# Patient Record
Sex: MALE | Race: WHITE | NOT HISPANIC OR LATINO
[De-identification: names, ages, dates, MRNs, and addresses within clinical notes are randomized per-mention and may not be internally consistent; named-entity substitution may affect disease eponyms.]

---

## 2017-11-08 ENCOUNTER — RECORDS - HEALTHEAST (OUTPATIENT)
Dept: ADMINISTRATIVE | Facility: OTHER | Age: 52
End: 2017-11-08

## 2017-11-09 ENCOUNTER — RECORDS - HEALTHEAST (OUTPATIENT)
Dept: ADMINISTRATIVE | Facility: OTHER | Age: 52
End: 2017-11-09

## 2017-11-10 ASSESSMENT — MIFFLIN-ST. JEOR: SCORE: 1682.67

## 2017-11-11 ENCOUNTER — ANESTHESIA - HEALTHEAST (OUTPATIENT)
Dept: SURGERY | Facility: HOSPITAL | Age: 52
End: 2017-11-11

## 2017-11-11 ENCOUNTER — SURGERY - HEALTHEAST (OUTPATIENT)
Dept: SURGERY | Facility: HOSPITAL | Age: 52
End: 2017-11-11

## 2017-11-17 ENCOUNTER — COMMUNICATION - HEALTHEAST (OUTPATIENT)
Dept: SURGERY | Facility: CLINIC | Age: 52
End: 2017-11-17

## 2017-11-17 DIAGNOSIS — R11.0 NAUSEA: ICD-10-CM

## 2017-12-05 ENCOUNTER — OFFICE VISIT - HEALTHEAST (OUTPATIENT)
Dept: SURGERY | Facility: CLINIC | Age: 52
End: 2017-12-05

## 2017-12-05 DIAGNOSIS — Z48.89 POSTOPERATIVE VISIT: ICD-10-CM

## 2017-12-27 ENCOUNTER — COMMUNICATION - HEALTHEAST (OUTPATIENT)
Dept: SURGERY | Facility: CLINIC | Age: 52
End: 2017-12-27

## 2017-12-27 DIAGNOSIS — R10.9 ABDOMINAL PAIN: ICD-10-CM

## 2017-12-28 ENCOUNTER — AMBULATORY - HEALTHEAST (OUTPATIENT)
Dept: SURGERY | Facility: CLINIC | Age: 52
End: 2017-12-28

## 2017-12-28 ENCOUNTER — HOSPITAL ENCOUNTER (OUTPATIENT)
Dept: CT IMAGING | Facility: CLINIC | Age: 52
Discharge: HOME | End: 2017-12-28
Attending: SPECIALIST

## 2017-12-28 DIAGNOSIS — R10.9 ABDOMINAL PAIN: ICD-10-CM

## 2017-12-28 ASSESSMENT — MIFFLIN-ST. JEOR: SCORE: 1630.51

## 2017-12-29 ENCOUNTER — SURGERY - HEALTHEAST (OUTPATIENT)
Dept: SURGERY | Facility: HOSPITAL | Age: 52
End: 2017-12-29

## 2017-12-29 ENCOUNTER — ANESTHESIA - HEALTHEAST (OUTPATIENT)
Dept: SURGERY | Facility: HOSPITAL | Age: 52
End: 2017-12-29

## 2018-01-03 ENCOUNTER — AMBULATORY - HEALTHEAST (OUTPATIENT)
Dept: SURGERY | Facility: CLINIC | Age: 53
End: 2018-01-03

## 2018-01-03 DIAGNOSIS — K76.9 LIVER LESION: ICD-10-CM

## 2018-01-12 ENCOUNTER — AMBULATORY - HEALTHEAST (OUTPATIENT)
Dept: ONCOLOGY | Facility: HOSPITAL | Age: 53
End: 2018-01-12

## 2018-01-12 ENCOUNTER — OFFICE VISIT - HEALTHEAST (OUTPATIENT)
Dept: INTERNAL MEDICINE | Facility: CLINIC | Age: 53
End: 2018-01-12

## 2018-01-12 DIAGNOSIS — C17.1 ADENOCARCINOMA OF JEJUNUM (H): ICD-10-CM

## 2018-01-12 DIAGNOSIS — R16.0 LIVER MASS: ICD-10-CM

## 2018-01-12 ASSESSMENT — MIFFLIN-ST. JEOR: SCORE: 1623.13

## 2018-01-15 ENCOUNTER — HOSPITAL ENCOUNTER (OUTPATIENT)
Dept: CT IMAGING | Facility: HOSPITAL | Age: 53
Discharge: HOME | End: 2018-01-15
Attending: SPECIALIST | Admitting: RADIOLOGY

## 2018-01-15 ENCOUNTER — HOSPITAL ENCOUNTER (OUTPATIENT)
Dept: INTERVENTIONAL RADIOLOGY/VASCULAR | Facility: HOSPITAL | Age: 53
Discharge: HOME | End: 2018-01-15
Attending: SPECIALIST

## 2018-01-15 ENCOUNTER — HOSPITAL ENCOUNTER (OUTPATIENT)
Dept: ULTRASOUND IMAGING | Facility: HOSPITAL | Age: 53
Discharge: HOME | End: 2018-01-15
Attending: SPECIALIST

## 2018-01-15 DIAGNOSIS — K76.9 LIVER LESION: ICD-10-CM

## 2018-01-15 LAB
HGB BLD-MCNC: 15.1 G/DL (ref 14–18)
INR PPP: 0.96 (ref 0.9–1.1)
PLATELET # BLD AUTO: 328 THOU/UL (ref 140–440)

## 2018-01-15 ASSESSMENT — MIFFLIN-ST. JEOR: SCORE: 1630.51

## 2018-01-16 ENCOUNTER — OFFICE VISIT - HEALTHEAST (OUTPATIENT)
Dept: SURGERY | Facility: CLINIC | Age: 53
End: 2018-01-16

## 2018-01-16 DIAGNOSIS — C17.9 ADENOCARCINOMA OF SMALL INTESTINE, STAGE 4 (H): ICD-10-CM

## 2018-01-16 LAB
LAB AP CHARGES (HE HISTORICAL CONVERSION): ABNORMAL
LAB AP INITIAL CYTO EVAL (HE HISTORICAL CONVERSION): ABNORMAL
LAB MED GENERAL PATH INTERP (HE HISTORICAL CONVERSION): ABNORMAL
PATH REPORT.COMMENTS IMP SPEC: ABNORMAL
PATH REPORT.COMMENTS IMP SPEC: ABNORMAL
PATH REPORT.FINAL DX SPEC: ABNORMAL
PATH REPORT.MICROSCOPIC SPEC OTHER STN: ABNORMAL
PATH REPORT.RELEVANT HX SPEC: ABNORMAL
SPECIMEN DESCRIPTION: ABNORMAL

## 2018-01-18 ENCOUNTER — ANESTHESIA - HEALTHEAST (OUTPATIENT)
Dept: SURGERY | Facility: AMBULATORY SURGERY CENTER | Age: 53
End: 2018-01-18

## 2018-01-18 ASSESSMENT — MIFFLIN-ST. JEOR: SCORE: 1630.51

## 2018-01-19 ENCOUNTER — HOSPITAL ENCOUNTER (OUTPATIENT)
Dept: PET IMAGING | Facility: HOSPITAL | Age: 53
Discharge: HOME | End: 2018-01-19
Attending: INTERNAL MEDICINE

## 2018-01-19 ENCOUNTER — SURGERY - HEALTHEAST (OUTPATIENT)
Dept: SURGERY | Facility: AMBULATORY SURGERY CENTER | Age: 53
End: 2018-01-19

## 2018-01-19 DIAGNOSIS — C17.9 SMALL BOWEL CANCER (H): ICD-10-CM

## 2018-01-19 LAB — GLUCOSE BLDC GLUCOMTR-MCNC: 84 MG/DL

## 2018-01-22 ENCOUNTER — OFFICE VISIT - HEALTHEAST (OUTPATIENT)
Dept: ONCOLOGY | Facility: CLINIC | Age: 53
End: 2018-01-22

## 2018-01-22 DIAGNOSIS — C17.9 ADENOCARCINOMA OF SMALL INTESTINE, STAGE 4 (H): ICD-10-CM

## 2018-01-22 DIAGNOSIS — C17.9 SMALL BOWEL CANCER (H): ICD-10-CM

## 2018-01-22 RX ORDER — ONDANSETRON 4 MG/1
4-8 TABLET, FILM COATED ORAL EVERY 4 HOURS PRN
Qty: 30 TABLET | Refills: 3 | Status: SHIPPED | OUTPATIENT
Start: 2018-01-22

## 2018-01-22 RX ORDER — ACETAMINOPHEN 500 MG
500 TABLET ORAL EVERY 6 HOURS PRN
Status: SHIPPED | COMMUNITY
Start: 2018-01-22

## 2018-01-22 RX ORDER — IBUPROFEN 200 MG
600 TABLET ORAL EVERY 6 HOURS PRN
Status: SHIPPED | COMMUNITY
Start: 2018-01-22

## 2018-01-23 ENCOUNTER — COMMUNICATION - HEALTHEAST (OUTPATIENT)
Dept: HEALTH INFORMATION MANAGEMENT | Facility: CLINIC | Age: 53
End: 2018-01-23

## 2018-01-23 ENCOUNTER — HOSPITAL ENCOUNTER (OUTPATIENT)
Dept: MRI IMAGING | Facility: CLINIC | Age: 53
Setting detail: RADIATION/ONCOLOGY SERIES
Discharge: STILL ADMITTED OR ADMINISTRATIVE | End: 2018-01-23
Attending: INTERNAL MEDICINE

## 2018-01-23 DIAGNOSIS — C17.9 SMALL BOWEL CANCER (H): ICD-10-CM

## 2018-01-24 ENCOUNTER — AMBULATORY - HEALTHEAST (OUTPATIENT)
Dept: INFUSION THERAPY | Facility: CLINIC | Age: 53
End: 2018-01-24

## 2018-01-30 ENCOUNTER — INFUSION - HEALTHEAST (OUTPATIENT)
Dept: INFUSION THERAPY | Facility: CLINIC | Age: 53
End: 2018-01-30

## 2018-01-30 DIAGNOSIS — C17.9 ADENOCARCINOMA OF SMALL INTESTINE, STAGE 4 (H): ICD-10-CM

## 2018-01-30 LAB
ALBUMIN SERPL-MCNC: 3.4 G/DL (ref 3.5–5)
ALBUMIN UR-MCNC: NEGATIVE MG/DL
ALP SERPL-CCNC: 104 U/L (ref 45–120)
ALT SERPL W P-5'-P-CCNC: 26 U/L (ref 0–45)
ANION GAP SERPL CALCULATED.3IONS-SCNC: 8 MMOL/L (ref 5–18)
AST SERPL W P-5'-P-CCNC: 17 U/L (ref 0–40)
BASOPHILS # BLD AUTO: 0.1 THOU/UL (ref 0–0.2)
BASOPHILS NFR BLD AUTO: 1 % (ref 0–2)
BILIRUB SERPL-MCNC: 0.2 MG/DL (ref 0–1)
BUN SERPL-MCNC: 20 MG/DL (ref 8–22)
CALCIUM SERPL-MCNC: 9.1 MG/DL (ref 8.5–10.5)
CEA SERPL-MCNC: 2.9 NG/ML (ref 0–3)
CHLORIDE BLD-SCNC: 108 MMOL/L (ref 98–107)
CO2 SERPL-SCNC: 24 MMOL/L (ref 22–31)
CREAT SERPL-MCNC: 0.76 MG/DL (ref 0.7–1.3)
EOSINOPHIL # BLD AUTO: 0.6 THOU/UL (ref 0–0.4)
EOSINOPHIL NFR BLD AUTO: 6 % (ref 0–6)
ERYTHROCYTE [DISTWIDTH] IN BLOOD BY AUTOMATED COUNT: 12 % (ref 11–14.5)
GFR SERPL CREATININE-BSD FRML MDRD: >60 ML/MIN/1.73M2
GLUCOSE BLD-MCNC: 100 MG/DL (ref 70–125)
HCT VFR BLD AUTO: 42 % (ref 40–54)
HGB BLD-MCNC: 14.3 G/DL (ref 14–18)
LYMPHOCYTES # BLD AUTO: 2.4 THOU/UL (ref 0.8–4.4)
LYMPHOCYTES NFR BLD AUTO: 24 % (ref 20–40)
MAGNESIUM SERPL-MCNC: 2.2 MG/DL (ref 1.8–2.6)
MCH RBC QN AUTO: 31.5 PG (ref 27–34)
MCHC RBC AUTO-ENTMCNC: 34 G/DL (ref 32–36)
MCV RBC AUTO: 93 FL (ref 80–100)
MONOCYTES # BLD AUTO: 0.8 THOU/UL (ref 0–0.9)
MONOCYTES NFR BLD AUTO: 8 % (ref 2–10)
NEUTROPHILS # BLD AUTO: 6.3 THOU/UL (ref 2–7.7)
NEUTROPHILS NFR BLD AUTO: 62 % (ref 50–70)
PLATELET # BLD AUTO: 251 THOU/UL (ref 140–440)
PMV BLD AUTO: 8.8 FL (ref 8.5–12.5)
POTASSIUM BLD-SCNC: 4 MMOL/L (ref 3.5–5)
PROT SERPL-MCNC: 7.1 G/DL (ref 6–8)
RBC # BLD AUTO: 4.54 MILL/UL (ref 4.4–6.2)
SODIUM SERPL-SCNC: 140 MMOL/L (ref 136–145)
WBC: 10.2 THOU/UL (ref 4–11)

## 2018-02-01 ENCOUNTER — INFUSION - HEALTHEAST (OUTPATIENT)
Dept: INFUSION THERAPY | Facility: CLINIC | Age: 53
End: 2018-02-01

## 2018-02-01 DIAGNOSIS — C17.9 ADENOCARCINOMA OF SMALL INTESTINE, STAGE 4 (H): ICD-10-CM

## 2018-02-12 ENCOUNTER — OFFICE VISIT - HEALTHEAST (OUTPATIENT)
Dept: ONCOLOGY | Facility: CLINIC | Age: 53
End: 2018-02-12

## 2018-02-12 ENCOUNTER — AMBULATORY - HEALTHEAST (OUTPATIENT)
Dept: INFUSION THERAPY | Facility: CLINIC | Age: 53
End: 2018-02-12

## 2018-02-12 DIAGNOSIS — Z51.11 CHEMOTHERAPY MANAGEMENT, ENCOUNTER FOR: ICD-10-CM

## 2018-02-12 DIAGNOSIS — C17.9 ADENOCARCINOMA OF SMALL INTESTINE, STAGE 4 (H): ICD-10-CM

## 2018-02-12 DIAGNOSIS — C17.1 ADENOCARCINOMA OF JEJUNUM (H): ICD-10-CM

## 2018-02-12 LAB
ALBUMIN SERPL-MCNC: 3.6 G/DL (ref 3.5–5)
ALP SERPL-CCNC: 98 U/L (ref 45–120)
ALT SERPL W P-5'-P-CCNC: 33 U/L (ref 0–45)
ANION GAP SERPL CALCULATED.3IONS-SCNC: 9 MMOL/L (ref 5–18)
AST SERPL W P-5'-P-CCNC: 21 U/L (ref 0–40)
BASOPHILS # BLD AUTO: 0.1 THOU/UL (ref 0–0.2)
BASOPHILS NFR BLD AUTO: 1 % (ref 0–2)
BILIRUB SERPL-MCNC: 0.2 MG/DL (ref 0–1)
BUN SERPL-MCNC: 14 MG/DL (ref 8–22)
CALCIUM SERPL-MCNC: 9 MG/DL (ref 8.5–10.5)
CHLORIDE BLD-SCNC: 107 MMOL/L (ref 98–107)
CO2 SERPL-SCNC: 24 MMOL/L (ref 22–31)
CREAT SERPL-MCNC: 0.75 MG/DL (ref 0.7–1.3)
EOSINOPHIL # BLD AUTO: 0.3 THOU/UL (ref 0–0.4)
EOSINOPHIL NFR BLD AUTO: 4 % (ref 0–6)
ERYTHROCYTE [DISTWIDTH] IN BLOOD BY AUTOMATED COUNT: 12.3 % (ref 11–14.5)
GFR SERPL CREATININE-BSD FRML MDRD: >60 ML/MIN/1.73M2
GLUCOSE BLD-MCNC: 101 MG/DL (ref 70–125)
HCT VFR BLD AUTO: 41.9 % (ref 40–54)
HGB BLD-MCNC: 14.4 G/DL (ref 14–18)
LYMPHOCYTES # BLD AUTO: 2.1 THOU/UL (ref 0.8–4.4)
LYMPHOCYTES NFR BLD AUTO: 30 % (ref 20–40)
MAGNESIUM SERPL-MCNC: 2.1 MG/DL (ref 1.8–2.6)
MCH RBC QN AUTO: 31.4 PG (ref 27–34)
MCHC RBC AUTO-ENTMCNC: 34.4 G/DL (ref 32–36)
MCV RBC AUTO: 92 FL (ref 80–100)
MONOCYTES # BLD AUTO: 0.8 THOU/UL (ref 0–0.9)
MONOCYTES NFR BLD AUTO: 11 % (ref 2–10)
NEUTROPHILS # BLD AUTO: 3.9 THOU/UL (ref 2–7.7)
NEUTROPHILS NFR BLD AUTO: 54 % (ref 50–70)
PLATELET # BLD AUTO: 244 THOU/UL (ref 140–440)
PMV BLD AUTO: 8.7 FL (ref 8.5–12.5)
POTASSIUM BLD-SCNC: 4 MMOL/L (ref 3.5–5)
PROT SERPL-MCNC: 6.9 G/DL (ref 6–8)
RBC # BLD AUTO: 4.58 MILL/UL (ref 4.4–6.2)
SODIUM SERPL-SCNC: 140 MMOL/L (ref 136–145)
WBC: 7.1 THOU/UL (ref 4–11)

## 2018-02-12 ASSESSMENT — MIFFLIN-ST. JEOR: SCORE: 1706.71

## 2018-02-16 ENCOUNTER — HOSPITAL ENCOUNTER (OUTPATIENT)
Dept: CARDIOLOGY | Facility: CLINIC | Age: 53
Discharge: HOME | End: 2018-02-16
Attending: INTERNAL MEDICINE

## 2018-02-16 DIAGNOSIS — C17.1 ADENOCARCINOMA OF JEJUNUM (H): ICD-10-CM

## 2018-02-16 LAB
AORTIC ROOT: 3.5 CM
BSA FOR ECHO PROCEDURE: 2.04 M2
CV BLOOD PRESSURE: NORMAL MMHG
CV ECHO HEIGHT: 74 IN
CV ECHO WEIGHT: 176 LBS
DOP CALC LVOT AREA: 3.14 CM2
DOP CALC LVOT DIAMETER: 2 CM
DOP CALC LVOT PEAK VEL: 80.5 CM/S
DOP CALC LVOT STROKE VOLUME: 39.9 CM3
DOP CALCLVOT PEAK VEL VTI: 12.7 CM
EJECTION FRACTION: 56 % (ref 55–75)
INTERVENTRICULAR SEPTUM IN END DIASTOLE: 0.73 CM (ref 0.6–1)
IVS/PW RATIO: 0.7
LA AREA 1: 10.7 CM2
LA AREA 2: 10.9 CM2
LEFT ATRIUM LENGTH: 3.59 CM
LEFT ATRIUM LENGTH: 3.89 CM
LEFT ATRIUM SIZE: 3.2 CM
LEFT ATRIUM SIZE: 3.2 CM
LEFT ATRIUM TO AORTIC ROOT RATIO: 0.91 NO UNITS
LEFT VENTRICLE DIASTOLIC VOLUME INDEX: 38.7 CM3/M2 (ref 34–74)
LEFT VENTRICLE DIASTOLIC VOLUME: 79 CM3 (ref 62–150)
LEFT VENTRICLE MASS INDEX: 79.8 G/M2
LEFT VENTRICLE SYSTOLIC VOLUME INDEX: 17.2 CM3/M2 (ref 11–31)
LEFT VENTRICLE SYSTOLIC VOLUME: 35 CM3 (ref 21–61)
LEFT VENTRICULAR INTERNAL DIMENSION IN DIASTOLE: 5.17 CM (ref 4.2–5.8)
LEFT VENTRICULAR MASS: 162.9 G
LEFT VENTRICULAR OUTFLOW TRACT MEAN GRADIENT: 1 MMHG
LEFT VENTRICULAR OUTFLOW TRACT MEAN VELOCITY: 50.8 CM/S
LEFT VENTRICULAR OUTFLOW TRACT PEAK GRADIENT: 3 MMHG
LEFT VENTRICULAR POSTERIOR WALL IN END DIASTOLE: 1.03 CM (ref 0.6–1)
LV STROKE VOLUME INDEX: 19.5 ML/M2
MITRAL VALVE E/A RATIO: 0.9
MV AVERAGE E/E' RATIO: 7 CM/S
MV DECELERATION TIME: 261 MS
MV E'TISSUE VEL-LAT: 8.52 CM/S
MV E'TISSUE VEL-MED: 8.29 CM/S
MV LATERAL E/E' RATIO: 6.9
MV MEDIAL E/E' RATIO: 7.1
MV PEAK A VELOCITY: 66.1 CM/S
MV PEAK E VELOCITY: 59.2 CM/S
NUC REST DIASTOLIC VOLUME INDEX: 2816 LBS
NUC REST SYSTOLIC VOLUME INDEX: 74 IN
TRICUSPID VALVE ANULAR PLANE SYSTOLIC EXCURSION: 1.8 CM

## 2018-02-16 ASSESSMENT — MIFFLIN-ST. JEOR: SCORE: 1703.08

## 2018-02-19 ENCOUNTER — INFUSION - HEALTHEAST (OUTPATIENT)
Dept: INFUSION THERAPY | Facility: CLINIC | Age: 53
End: 2018-02-19

## 2018-02-19 DIAGNOSIS — C17.1 ADENOCARCINOMA OF JEJUNUM (H): ICD-10-CM

## 2018-02-21 ENCOUNTER — INFUSION - HEALTHEAST (OUTPATIENT)
Dept: INFUSION THERAPY | Facility: CLINIC | Age: 53
End: 2018-02-21

## 2018-02-21 ENCOUNTER — AMBULATORY - HEALTHEAST (OUTPATIENT)
Dept: ONCOLOGY | Facility: CLINIC | Age: 53
End: 2018-02-21

## 2018-02-21 ENCOUNTER — AMBULATORY - HEALTHEAST (OUTPATIENT)
Dept: ONCOLOGY | Facility: HOSPITAL | Age: 53
End: 2018-02-21

## 2018-02-21 DIAGNOSIS — C17.1 ADENOCARCINOMA OF JEJUNUM (H): ICD-10-CM

## 2018-02-21 RX ORDER — DEXAMETHASONE 4 MG/1
8 TABLET ORAL
Status: SHIPPED | COMMUNITY
Start: 2018-02-21

## 2018-02-23 ENCOUNTER — COMMUNICATION - HEALTHEAST (OUTPATIENT)
Dept: ONCOLOGY | Facility: CLINIC | Age: 53
End: 2018-02-23

## 2018-04-02 ENCOUNTER — AMBULATORY - HEALTHEAST (OUTPATIENT)
Dept: ONCOLOGY | Facility: CLINIC | Age: 53
End: 2018-04-02

## 2021-05-31 VITALS — BODY MASS INDEX: 20.54 KG/M2 | HEIGHT: 74 IN

## 2021-05-31 VITALS — BODY MASS INDEX: 22.69 KG/M2 | HEIGHT: 74 IN | WEIGHT: 176.8 LBS

## 2021-05-31 VITALS — WEIGHT: 179 LBS | BODY MASS INDEX: 22.98 KG/M2

## 2021-05-31 VITALS — HEIGHT: 73 IN | BODY MASS INDEX: 23.19 KG/M2 | WEIGHT: 175 LBS

## 2021-05-31 VITALS — BODY MASS INDEX: 20.53 KG/M2 | HEIGHT: 74 IN | WEIGHT: 160 LBS

## 2021-05-31 VITALS — HEIGHT: 74 IN | BODY MASS INDEX: 22.59 KG/M2 | WEIGHT: 176 LBS

## 2021-05-31 VITALS — BODY MASS INDEX: 20.53 KG/M2 | WEIGHT: 160 LBS | HEIGHT: 74 IN

## 2021-05-31 VITALS — WEIGHT: 170 LBS | BODY MASS INDEX: 21.83 KG/M2

## 2021-05-31 VITALS — BODY MASS INDEX: 21.34 KG/M2 | HEIGHT: 73 IN | WEIGHT: 161 LBS

## 2021-05-31 VITALS — BODY MASS INDEX: 23.11 KG/M2 | WEIGHT: 180 LBS

## 2021-06-14 NOTE — ANESTHESIA PROCEDURE NOTES
Peripheral Block    Patient location during procedure: post-op  Start time: 11/11/2017 3:32 PM  End time: 11/11/2017 3:35 PM  post-op analgesia per surgeon order as noted in medical record  Staffing:  Performing  Anesthesiologist: BLANCA CULP  Preanesthetic Checklist  Completed: patient identified, site marked, risks, benefits, and alternatives discussed, timeout performed, consent obtained, airway assessed, oxygen available, suction available, emergency drugs available and hand hygiene performed  Peripheral Block  Block type: other, TAP  Prep: ChloraPrep  Patient position: supine  Patient monitoring: cardiac monitor, continuous pulse oximetry, heart rate and blood pressure  Laterality: right  Injection technique: ultrasound guided    Ultrasound used to visualize needle placement in proximity to nerve being blocked: yes   Permanent ultrasound image captured for medical record    Needle  Needle type: echogenic   Needle gauge: 20G  Needle length: 4 in  no peripheral nerve catheter placed  Assessment  Injection assessment: no difficulty with injection, negative aspiration for heme, no paresthesia on injection and incremental injection  Additional Notes    Right transversus abdominis plane (TAP) block with bupivacaine 0.25% 20 cc. Patient tolerated procedure well.    Blanca Culp MD  Staff Anesthesiologist  [facility]

## 2021-06-14 NOTE — PROGRESS NOTES
Terrell is in for follow-up of a Diagnostic laparoscopy with lysis of adhesion and bilateral inguinal hernia repairs.    He is now just a little less than 3 weeks out from surgery.  Pain is very well controlled. No fevers. Eating fairly well.  He is still getting some discomfort after he eats.  He states it is nothing compared to what he had prior to surgery.  He has never vomited.  He just gets gassy feeling if he eats a little too much.    Physical exam:  General: He is moving around well with no signs of discomfort.  Abdomen: Soft minimal tenderness.  The incision(s) is healing up nicely with no signs of infection.      Impression: Doing well postoperatively.  No signs of complications.  Where I lysed the one band of adhesions may still just be a little narrowed.  I do not want to do anything at this point.  Showed him how he has healing ridges at the hernia sites and this can be going on in the bowel also.  I would have him wait at least 6-8 weeks and if he is still having these kind of symptoms we could do another laparoscopy and just take a look at that segment of bowel.  My suspicion is that with this will continue to improve with time.  Plan: Encouraged  to slowly get back to normal activities.  Told that it would be at least another 2-4 weeks before getting back to normal.  Follow-up with me will be as needed if he is still having symptoms 6-8 weeks after surgery.

## 2021-06-14 NOTE — ANESTHESIA CARE TRANSFER NOTE
Last vitals:   Vitals:    11/11/17 1600   BP: 120/60   Pulse: 83   Resp: 18   Temp:    SpO2: 99%     Patient's level of consciousness is drowsy  Spontaneous respirations: yes  Maintains airway independently: yes  Dentition unchanged: yes  Oropharynx: oropharynx clear of all foreign objects    QCDR Measures:  ASA# 20 - Surgical Safety Checklist: WHO surgical safety checklist completed prior to induction  PQRS# 430 - Adult PONV Prevention: 4558F - Pt received => 2 anti-emetic agents (different classes) preop & intraop  ASA# 8 - Peds PONV Prevention: NA - Not pediatric patient, not GA or 2 or more risk factors NOT present  PQRS# 424 - Asya-op Temp Management: 4559F - At least one body temp DOCUMENTED => 35.5C or 95.9F within required timeframe  PQRS# 426 - PACU Transfer Protocol: - Transfer of care checklist used  ASA# 14 - Acute Post-op Pain: ASA14B - Patient did NOT experience pain >= 7 out of 10

## 2021-06-14 NOTE — ANESTHESIA PREPROCEDURE EVALUATION
Anesthesia Evaluation      Patient summary reviewed   No history of anesthetic complications (Nausea with opioids post-op)     Airway   Mallampati: II  Neck ROM: full   Pulmonary - negative ROS    breath sounds clear to auscultation  (+) a smoker                         Cardiovascular - negative ROS  Exercise tolerance: > or = 4 METS  ECG reviewed (NSR, RAD, left ant fasic block)  Rhythm: regular        Neuro/Psych - negative ROS     Endo/Other - negative ROS      GI/Hepatic/Renal - negative ROS      Other findings:     NPO > 24 hrs for solid. > 2 hrs for CL        B/L inguinal hernias and partial SBO     Results for JAVED GUTIERREZ (MRN [id number]) as of 11/11/2017 12:41    11/10/2017 17:30  Sodium: 140  Potassium: 3.7  Chloride: 95 (L)  CO2: 27  Anion Gap, Calculation: 18  BUN: 11  Creatinine: 1.00  GFR MDRD Af Amer: >60  GFR MDRD Non Af Amer: >60  Calcium: 10.7 (H)  AST: 23  ALT: 20  ALBUMIN: 4.3  Protein, Total: 8.5 (H)  Alkaline Phosphatase: 106  Bilirubin, Total: 0.5  Bilirubin, Direct: 0.2  Glucose: 85  Lipase: 14  WBC: 10.0  RBC: 6.02  Hemoglobin: 19.0 (H)  Hematocrit: 53.1  MCV: 88  MCH: 31.6  MCHC: 35.8  RDW: 11.2  Platelets: 332  MPV: 9.7  Neutrophils %: 70  Lymphocytes %: 19 (L)    11/10/2017 18:29  Lactic Acid: 1.7            Dental - normal exam                        Anesthesia Plan  Planned anesthetic: general endotracheal      TAP blocks for post-op pain as requested by surgeon.  Esmolol  Zofran, decadron 10 mg        ASA 2 - emergent   Induction: intravenous   Anesthetic plan and risks discussed with: patient  Anesthesia plan special considerations: rapid sequence induction, antiemetics,   Post-op plan: routine recovery        Blanca Culp MD  Staff Anesthesiologist  [facility]  11/11/17      
Detail Level: Detailed

## 2021-06-14 NOTE — ANESTHESIA PROCEDURE NOTES
Peripheral Block    Patient location during procedure: post-op  Start time: 11/11/2017 3:35 PM  End time: 11/11/2017 3:37 PM  post-op analgesia per surgeon order as noted in medical record  Staffing:  Performing  Anesthesiologist: BLANCA CULP  Preanesthetic Checklist  Completed: patient identified, site marked, risks, benefits, and alternatives discussed, timeout performed, consent obtained, airway assessed, oxygen available, suction available, emergency drugs available and hand hygiene performed  Peripheral Block  Block type: other, TAP  Prep: ChloraPrep  Patient position: supine  Patient monitoring: cardiac monitor, continuous pulse oximetry, heart rate and blood pressure  Laterality: left  Injection technique: ultrasound guided    Ultrasound used to visualize needle placement in proximity to nerve being blocked: yes   Permanent ultrasound image captured for medical record    Needle  Needle type: echogenic   Needle gauge: 20G  Needle length: 4 in  no peripheral nerve catheter placed  Assessment  Injection assessment: no difficulty with injection, negative aspiration for heme, no paresthesia on injection and incremental injection  Additional Notes      Left transversus abdominis plane (TAP) block with bupivacaine 0.25% 20 cc. Patient tolerated procedure well.    Blanca Culp MD  Staff Anesthesiologist  [facility]

## 2021-06-14 NOTE — ANESTHESIA POSTPROCEDURE EVALUATION
Patient: Terrell Bruno  DIAGNOSTIC LAPAROSCOPY WITH LYSIS OF ADHESION, REPAIR, HERNIA, INGUINAL, BILATERAL  Anesthesia type: general    Patient location: PACU  Last vitals:   Vitals:    11/11/17 1630   BP: 124/62   Pulse: 71   Resp: 16   Temp:    SpO2: 98%     Post vital signs: stable  Level of consciousness: awake and responds to simple questions  Post-anesthesia pain: pain controlled  Post-anesthesia nausea and vomiting: no  Pulmonary: unassisted, return to baseline  Cardiovascular: stable and blood pressure at baseline  Hydration: adequate  Anesthetic events: no    QCDR Measures:  ASA# 11 - Asya-op Cardiac Arrest: ASA11B - Patient did NOT experience unanticipated cardiac arrest  ASA# 12 - Asya-op Mortality Rate: ASA12B - Patient did NOT die  ASA# 13 - PACU Re-Intubation Rate: ASA13B - Patient did NOT require a new airway mgmt  ASA# 10 - Composite Anes Safety: ASA10A - No serious adverse event    Additional Notes:

## 2021-06-15 NOTE — PROCEDURES
POST PROCEDURE NOTE    Post procedure Diagnosis: Liver lesion    Technical Procedure: Ultrasound guided liver biopsy.    Findings: Liver lesion    Physician: Ezekiel Boggs    EBL:  Minimal    Specimens Removed:  Three 18 gauge core biopsies    Complications:  None.

## 2021-06-15 NOTE — ANESTHESIA PREPROCEDURE EVALUATION
Anesthesia Evaluation      Patient summary reviewed     Airway   Mallampati: II  Neck ROM: full   Pulmonary - normal exam   (+) a smoker                         Cardiovascular - negative ROS and normal exam   Neuro/Psych - negative ROS     Endo/Other - negative ROS      GI/Hepatic/Renal - negative ROS           Dental                         Anesthesia Plan  Planned anesthetic: general endotracheal  RSI, Anectine  ASA 3   Induction: intravenous   Anesthetic plan and risks discussed with: patient  Anesthesia plan special considerations: rapid sequence induction,   Post-op plan: routine recovery

## 2021-06-15 NOTE — PROGRESS NOTES
Received call from Dr Hester office for Medical Oncology Consult. Placed call to Terrell to schedule appointment. Same scheduled for January 22nd 2018 at 11:00 am with Dr Feliz, with a nurse appt at 10:30 am at [facility].     E mail sent to Terrell with Introduction letter, MD bio card, [facility] intake form, medication and allergy list, [employer], and appointment letter.    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  Denies any previous history of cancer and no other imaging than what was done here at [facility]. Medical records to ensure patient records in order for appointment.     Explained that I would be Terrell's nurse navigator.  I generally provide assistance with psycho social needs including but not limited to, financial assistance, obtaining education materials, transportation assistance, help with meals, community programs, and help finding support or support groups, getting connected with our psychotherapist when needed.  I am also here to help guide you through our system.      I am in the office Monday thru Friday.  I am in and out of my office throughout the day, If I do not answer my phone please leave me a message and I will get back to you as soon as possible. If you are ever unsure of who to call you can always contact me and I will help assist you in any way that I can.  Medication or symptom management needs typically go through our triage nurse who can be reached by calling the main clinic number. [phone]    Please do not hesitate to contact me if you have any questions or concerns.      Terrell asked to arrive at 10:30 at [facility] and to have health history form and medication and allergy list completed prior to arrival and to bring same to appointment.

## 2021-06-15 NOTE — CONSULTS
[facility] Hematology and Oncology Consult Note    Patient: Terrell Bruno  MRN: [id number]  Date of Service: 01/22/2018      Reason for Visit:    1.  Adenocarcinoma of the jejunum    Assessment/Plan:    1.  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  Reviewed the pathologic findings with the patient and his wife.  We reviewed his CT imaging.  Reviewed staging.  He has stage IV disease.  I told him that he is likely incurable.  At this point, he seems to have a very low level of metastatic disease.  His prognosis probably will be better than average if we can proceed with liver directed therapy.  Recommending chemotherapy with FOLFOX and Avastin for him.  I would like to give him 6 cycles (3 months) and, if he is responding, have a liver resection or other liver directed therapy.  We will reimage him with a CT scan after 3 cycles and if he is not responding will add irinotecan.  I reviewed the plan with the patient and his wife.  We talked about some of the more common side effects of this regimen which include, but are not limited to, fatigue, nausea, decreased appetite, cold sensitivity, peripheral neuropathy, and diarrhea.  He was given written information on the drugs to take home and review.  A consent form was signed.  His port has been placed.  He will attend chemo class.  We will start in 1 week.  In the meantime, we will get an MRI of the liver.  His tumor will be sent for predictive markers as well.  Patient and his wife had an opportunity to have their questions answered.    ECOG Performance   ECOG Performance Status: 1    Distress Assessment  Distress Assessment Score: 8 (due to the new dx and plan)    Problem List:    1. Small bowel cancer  NM PET CT Skull to Mid Thigh    MR Liver With Without Contrast   2. Adenocarcinoma of small intestine, stage 4  Infusion Appointment    CC pump visit (DC pump and flush port)     Staging History:    Adenocarcinoma of jejunum    Staging  form: Small Intestine - Adenocarcinoma, AJCC 8th Edition    - Clinical: No stage assigned - Unsigned    - Pathologic: Stage IV (pT3, pNX, pM1) - Signed by Chauncey Feliz MD on 1/22/2018    History:    Terrell comes in for consultation regarding a recently diagnosed jejunal adenocarcinoma.  The patient history begins in September 2017 when he developed intermittent abdominal discomfort.  It was progressive.  He had some early satiety and weight loss.  He had a CT scan in November which showed a partial bowel obstruction.  It was initially thought these were related to bilateral inguinal hernias.  His symptoms progressed.  He presented to the emergency room at [facility] on November 10.  At that time he was having a lot of vomiting and significant crampy abdominal pain.  He was seen by surgery.  He was taken to the operating room on November 11.  There is evidence of small bowel obstruction.  During this procedure it was thought to be from an adhesion which was freed up.  Initially he did well but then he developed more abdominal discomfort after eating.  Repeat CT scan showed persistently dilated proximal small bowel loops.  Concern was for a chronic stricture.  He was readmitted on December 29, 2017 with recurrent small bowel obstruction.  He went to exploratory laparoscopy.  He was found to have a significant stricture in the jejunum.  It was extremely tight and hard.  He had a small bowel resection as a result.  Pathology revealed an invasive well to moderately differentiated adenocarcinoma.  His previous CTs also noted a lesion in the liver.  It was fluid density and thought to be a cyst or possibly an abscess.  This was ultimately biopsied on January 15, 2018 and showed metastatic adenocarcinoma with morphology similar to that found in the small bowel.  Today he is feeling okay.  Minimal abdominal pain.  He is eating well and gaining weight.  No nausea vomiting.    Past History:    Past Medical History:    Diagnosis Date     Cancer     small intestine     Colon cancer      Partial bowel obstruction     Family History   Problem Relation Age of Onset     Skin cancer Father       [unfilled] Social History     Social History     Marital status:      Spouse name: N/A     Number of children: N/A     Years of education: N/A     Occupational History     Not on file.     Social History Main Topics     Smoking status: Current Every Day Smoker     Packs/day: 0.50     Types: Cigarettes     Smokeless tobacco: Never Used     Alcohol use 1.2 oz/week     2 Glasses of wine per week     Drug use: No     Sexual activity: Not on file     Other Topics Concern     Not on file     Social History Narrative        Allergies:    No Known Allergies    Review of Systems:    General  General (WDL): All general elements are within defined limits  ENT  ENT (WDL): Exceptions to WDL  Tinnitus: Yes - Recent (Less than 3 months)  Respiratory  Respiratory (WDL): All respiratory elements are within defined limits  Cardiovascular  Cardiovascular (WDL): All cardiovascular elements are within defined limits  Endocrine  Endocrine (WDL): All endocrine elements are within defined limits  Gastrointestinal  Gastrointestinal (WDL): All gastrointestinal elements are within defined limits  Musculoskeletal  Musculoskeletal (WDL): All musculoskeletal elements are within defined limits  Neurological  Neurological (WDL): All neurological elements are within defined limits  Dominant Hand: Right  Psychological/Emotional  Psychological/Emotional (WDL): All psychological/emotional elements are within defined limits  Hematological/Lymphatic  Hematological/Lymphatic (WDL): All hematological/lymphatic elements are within defined limits  Dermatological  Dermatologic (WDL): All dermatological elements are within defined limits  Genitourinary/Reproductive  Genitourinary/Reproductive (WDL): All genitourinary/reproductive elements are within defined limits  Reproductive  "(Females only)     Pain  Currently in Pain: Yes  Pain Score (Initial OR Reassessment): 1  Pain Frequency: Intermittent  Location: left chest (port site)  Pain Characteristics : Aching  Pain Intervention(s): Home medication  Response to Interventions: adequate    Physical Exam:    Recent Vitals 1/22/2018   Height 6' 2\"   Weight -   BSA (m2) -   /75   Pulse 77   Temp 97.5   Temp src 1   SpO2 98   Some recent data might be hidden     General: patient appears stated age of 52 y.o.. Nontoxic and in no distress.   HEENT: Head: atraumatic, normocephalic. Sclerae anicteric.  Chest:  Normal respiratory effort  Cardiac:  No edema.   Abdomen: abdomen is non-distended  Extremities: normal tone and muscle bulk.  Skin: no lesions or rash. Warm and dry.   CNS: alert and oriented x3. Grossly non-focal.   Psychiatric: normal mood and affect.     Lab Results:    Recent Results (from the past 168 hour(s))   POCT Glucose   Result Value Ref Range    Glucose, POC 84 mg/dL     Imaging Results:    PET scan and CT scan imaging was reviewed.  Shows a solitary liver lesion.    Image Guided Liver Biopsy    Result Date: 1/15/2018  1. PERCUTANEOUS BIOPSY LIVER MASS 2. ULTRASOUND GUIDANCE 3. CONSCIOUS SEDATION 1/15/2018 11:17 AM INDICATION: Liver lesion, Known small bowel adenocarcinoma PROCEDURE: Informed consent obtained. Time out performed. The site was prepped and draped in sterile fashion. 10 mL of 1 percent lidocaine was infused into the local soft tissues. Using standard technique and under direct ultrasound guidance, an 18 gauge  biopsy needle was used to make three core biopsies of the central hepatic mass. Tissue was submitted to Pathology. The patient tolerated the procedure well. No complications. RADIOLOGIC SUPERVISION AND INTERPRETATION: ULTRASOUND GUIDANCE: Images demonstrate the biopsy needle in satisfactory position. SEDATION: Versed 1.5 mg. Fentanyl 150 mcg. The procedure was performed with administration intravenous " conscious sedation with appropriate preoperative, intraoperative, and postoperative evaluation. 30 minutes of supervised face to face conscious sedation time was provided by a radiology nurse under my direct supervision.     CONCLUSION: Successful ultrasound-guided biopsy of the hepatic mass.    Ct Abdomen Pelvis With Oral With Iv Contrast    Result Date: 12/28/2017  CT ABDOMEN PELVIS W ORAL W IV CONTRAST 12/28/2017 10:34 AM     INDICATION: abdominal pain; s/p diagnostic laparoscopy with lysis of adhesions 11/11/17 TECHNIQUE: CT abdomen and pelvis. Multiplanar reformation images (MPR). Dose reduction techniques were used. IV CONTRAST: Iohexol (Omni) 100 mL COMPARISON: 11/8/2017 CT FINDINGS: LUNG BASES: Minimal passive atelectasis. ABDOMEN: Enlarging central hepatic 4 cm fluid density lesion with some central enhancement, previously measured 2.7 cm. Significant distention of a portion of small bowel occupying both sides of the abdomen. There may be a transition point in the right upper quadrant (series 2, image 54, and series 4, image 25). No mass is seen. The distal small bowel decompressed. Aortoiliac atherosclerosis. The mesenteric vessels are patent. Normal-appearing kidneys, adrenals, spleen, gallbladder, and pancreas. PELVIS: Moderate amount stool in rectum. No evidence colonic obstruction. No adenopathy or ascites. Interval right inguinal herniorrhaphy. MUSCULOSKELETAL: L4-5 disc degeneration, and sacralization of L5. Grade 1 L3 on 4 anterolisthesis.     CONCLUSION: 1.  Significant proximal to mid small bowel dilation with possible transition point in the right upper quadrant. 2.  Enlarging 4 cm fluid density lesion in the liver. Abscess is possible. Recommend clinical correlation for infection. If further evaluation needed, recommend ultrasound.    Nm Pet Ct Skull To Mid Thigh    Result Date: 1/19/2018  [facility] PET FDG/CT 1/19/2018 9:25 AM INDICATION: Small bowel cancer staging., New diagnosis of  jejunal adenocarcinoma. Initial treatment strategy. TECHNIQUE: Serum glucose level 84 mg/dL. One hour post intravenous administration of 8.7 mCi F-18 FDG, PET imaging was performed from the skull base to the mid thighs utilizing attenuation correction with concurrent axial CT and PET/CT image fusion. Dose  reduction techniques were used. COMPARISON: CT 12/28/2017. FINDINGS: There is physiologic activity in the neck and chest. There is moderate FDG activity, SUV max 6.8, corresponding to a centrally necrotic solitary hepatic metastasis. The patient is status post recent small bowel resection and there is some minimal FDG activity at the operative site but no definite residual mass or mesenteric adenopathy. There is also mild FDG activity associated with bilateral inguinal hernia repairs. No additional sites of suspicious FDG activity. Degenerative disc disease lumbar interspaces.     CONCLUSION: Moderate FDG activity within a centrally necrotic solitary hepatic metastasis.    Pathology:    Final Diagnosis   SMALL INTESTINE, SEGMENTAL RESECTION:     - INVASIVE, WELL-TO-MODERATELY-DIFFERENTIATED ADENOCARCINOMA     - ADENOMATOUS MUCOSA WITH LOW-GRADE DYSPLASIA IS IDENTIFIED AT       ONE UNORIENTED RESECTION MARGIN     - PLEASE SEE SYNOPTIC REPORT BELOW FOR ADDITIONAL DETAILS     MCSS   Electronically signed by Jose Marr MD on 1/5/2018 at 1808   Comment  Immunohistochemical stains are performed for Mismatch Repair Protein Analysis (microsatellite instability). The results are as follows:         Immunohistochemistry (IHC) Testing for Mismatch Repair (MMR) Proteins:   MLH1:    - Intact nuclear expression     MSH2:    - Intact nuclear expression     MSH6:    - Intact nuclear expression     PMS2:    - Intact nuclear expression     Background non-neoplastic tissue/internal control with intact nuclear expression       IHC Interpretation:   - No loss of nuclear expression of MMR proteins: low probability of  microsatellite instability-high (MSI-H)     All controls stain appropriately.      Synoptic Report   SMALL INTESTINE   Small Int - All Specimens   SPECIMEN   Procedure  Segmental resection   TUMOR   Tumor Site  Jejunum   Histologic Type  Adenocarcinoma (not otherwise characterized)   Histologic Grade  G2: Moderately differentiated   Tumor Size  Greatest dimension in Centimeters (cm): 3.0 cm   Tumor Extension  Tumor invades through the muscularis propria into the subserosa, or extends into nonperitonealized perimuscular tissue (mesentery or retroperitoneum) without serosal penetration   Macroscopic Tumor Perforation  Not identified   Lymphovascular Invasion  Not identified   MARGINS   Margins  For Segmental Resection and Ileocolic Resection Specimens Only   Proximal Margin  Uninvolved by invasive carcinoma   Status of Dysplasia at Proximal Margin  Cannot be assessed: Adenocarcinoma is located approximately 0.7 cm from the unoriented proximal or distal resection margin.   Distal Margin  Uninvolved by invasive carcinoma   Status of Dysplasia at Distal Margin  Cannot be assessed: Adenocarcinoma is located approximately 0.7 cm from the unoriented proximal or distal resection margin.   Radial or Mesenteric Margin  Uninvolved by invasive carcinoma   Specify Margin  Cannot be determined: Adenocarcinoma is located approximately 0.7 cm from the unoriented proximal or distal resection margin.   Margin Status  Uninvolved by invasive carcinoma   LYMPH NODES   Regional Lymph Nodes  No lymph nodes submitted or found   PATHOLOGIC STAGE CLASSIFICATION (pTNM)   Primary Tumor (pT)  pT3: Tumor invades through the muscularis propria into the subserosa, or extends into nonperitonealized perimuscular tissue (mesentery or retroperitoneum) without serosal penetration   Regional Lymph Nodes (pN)  pNX: Regional lymph nodes cannot be assessed          Signed by: Chauncey Feliz MD

## 2021-06-15 NOTE — ANESTHESIA POSTPROCEDURE EVALUATION
Patient: Terrell Bruno   DIAGNOSTIC LAPAROSCOPY,  LAPAROTOMY, SEGMENTAL SMALL BOWEL RESECTION   Anesthesia type: general    Patient location: PACU  Last vitals:   Vitals:    12/29/17 1600   BP: 135/83   Pulse: 67   Resp: 15   Temp:    SpO2: 96%     Post vital signs: stable  Level of consciousness: awake and responds to simple questions  Post-anesthesia pain: pain controlled  Post-anesthesia nausea and vomiting: no  Pulmonary: unassisted, return to baseline  Cardiovascular: stable and blood pressure at baseline  Hydration: adequate  Anesthetic events: no    QCDR Measures:  ASA# 11 - Asya-op Cardiac Arrest: ASA11B - Patient did NOT experience unanticipated cardiac arrest  ASA# 12 - Asya-op Mortality Rate: ASA12B - Patient did NOT die  ASA# 13 - PACU Re-Intubation Rate: ASA13B - Patient did NOT require a new airway mgmt  ASA# 10 - Composite Anes Safety: ASA10A - No serious adverse event         Additional Notes:

## 2021-06-15 NOTE — PROGRESS NOTES
Merrick is in for follow-up of a Segmental small bowel resection for what originally appeared to just be a stricture of the small bowel.  Unfortunately the pathology came back positive for an adenocarcinoma.  In retrospect than the lesion in his liver which they were calling just a cyst became more suspicious.  He just underwent a needle biopsy of that yesterday in the preliminary report is that this is consistent with metastatic disease.   He is now just a little less than 3 weeks out from surgery.  Pain is well controlled. No fevers. Eating well.    Physical exam:  General: He is moving around well with no signs of discomfort.  Abdomen: Soft minimal tenderness.  The incision(s) is healing up nicely with no signs of infection.    Pathology: Adenocarcinoma of the small bowel.  Preliminary report on the needle biopsy of the liver shows cytologic malignant cells.  Final report is pending.    Impression: Stage IV small bowel carcinoma.  I went over all of this with him and his wife.  Unfortunately in retrospect the lesion was there even when he presented with a small bowel obstruction.  At that point however it just look like a small cyst in the lesion in the small bowel just look like a stricture from an adhesion.  I have already set him up to be discussed at tumor conference and he has an appointment with the oncologist next Monday.  My initial thought is that he should do chemotherapy to try to shrink the lesion down and then needs to be set up either at [facility] or the [facility] for a liver resection.  This is not something that can be done at [facility] facility.  He was obviously fairly devastated by this news about the liver biopsy results.  Reassured him that we will continue to be aggressive with his treatment and since it is a solitary lesion this still may be very treatable.  Plan: We will get him set up for port placement for his chemotherapy.  We will also look into getting a consult with a  hepatobiliary surgeon again I suspect we will want to do some chemotherapy first to try to shrink it down.

## 2021-06-15 NOTE — ANESTHESIA POSTPROCEDURE EVALUATION
Patient: Terrell Bruno  INSERTION, VASCULAR ACCESS PORT  Anesthesia type: MAC    Patient location: PACU  Last vitals:   Vitals:    01/19/18 1444   BP: 103/66   Pulse: 70   Resp: 16   Temp: 37  C (98.6  F)   SpO2: 100%     Post vital signs: stable  Level of consciousness: awake and responds to simple questions  Post-anesthesia pain: pain controlled  Post-anesthesia nausea and vomiting: no  Pulmonary: unassisted, return to baseline  Cardiovascular: stable and blood pressure at baseline  Hydration: adequate  Anesthetic events: no    QCDR Measures:  ASA# 11 - Asya-op Cardiac Arrest: ASA11B - Patient did NOT experience unanticipated cardiac arrest  ASA# 12 - Asya-op Mortality Rate: ASA12B - Patient did NOT die  ASA# 13 - PACU Re-Intubation Rate: ASA13B - Patient did NOT require a new airway mgmt  ASA# 10 - Composite Anes Safety: ASA10A - No serious adverse event       Additional Notes:

## 2021-06-15 NOTE — ANESTHESIA PREPROCEDURE EVALUATION
Anesthesia Evaluation        Airway   Mallampati: II  Neck ROM: full   Pulmonary    (+) a smoker                         Cardiovascular - negative ROS and normal exam   Neuro/Psych - negative ROS     Endo/Other - negative ROS      GI/Hepatic/Renal - negative ROS           Dental - normal exam                        Anesthesia Plan  Planned anesthetic: MAC    ASA 2     Anesthetic plan and risks discussed with: patient    Post-op plan: routine recovery

## 2021-06-15 NOTE — ANESTHESIA CARE TRANSFER NOTE
Last vitals:   Vitals:    12/29/17 1530   BP: 148/78   Pulse: 79   Resp: 16   Temp: 37.5  C (99.5  F)   SpO2: 100%     Patient spontaneous RR, -600s, suctioned, following commands, extubated to facemask 10LPM, O2 sats 100%. VSS. Report to RN.    Patient's level of consciousness is drowsy  Spontaneous respirations: yes  Maintains airway independently: yes  Dentition unchanged: yes  Oropharynx: oropharynx clear of all foreign objects    QCDR Measures:  ASA# 20 - Surgical Safety Checklist: WHO surgical safety checklist completed prior to induction  PQRS# 430 - Adult PONV Prevention: 4558F - Pt received => 2 anti-emetic agents (different classes) preop & intraop  ASA# 8 - Peds PONV Prevention: NA - Not pediatric patient, not GA or 2 or more risk factors NOT present  PQRS# 424 - Asya-op Temp Management: 4559F - At least one body temp DOCUMENTED => 35.5C or 95.9F within required timeframe  PQRS# 426 - PACU Transfer Protocol: - Transfer of care checklist used  ASA# 14 - Acute Post-op Pain: ASA14B - Patient did NOT experience pain >= 7 out of 10

## 2021-06-15 NOTE — PROGRESS NOTES
Pt admitted per [facility]ern for his first day of cycle 1 chemo. Pt states he slept well last night and isn't worried or concerned about treatment. States he feels so much better now that he has started to get his apetite back and has gained 10# Instructed about his pump and treatment and all chemo and side effects. Verbalizes understanding.Will return in 46 hours for pump dc and f/u.

## 2021-06-15 NOTE — PROGRESS NOTES
[facility] Follow Up Note    Terrell Bruno   52 y.o. male    Date of Visit: 1/12/2018    Chief Complaint   Patient presents with     Establish Care     new Dx of small bowel cancer, discuss plan     Subjective  Terrell is establishing care as a new patient for the new diagnosis of jejunal adenocarcinoma.    He is an otherwise healthy 52-year-old father of 4 children.    Patient began having intermittent abdominal pain since September of last year.  He had inguinal hernias, initial diagnosis of symptoms related to hernias and was set up for hernia surgery.    Increasing abdominal pain and 20 pound weight loss led to CT scan on November that showed a small bowel obstruction.  There is also a 2.7 cm liver fluid density noted on the CT scan.    He underwent lysis of adhesion surgery and hernia repair bilaterally in November.  He recovered well initially but then had recurrent abdominal pain with nausea and recurrent small bowel obstruction.  He returned on December 29 and underwent resection of the small bowel area where adhesion was in the jejunum.  Repeat CT scan on December 28 showed no adenopathy or ascites but an enlarging density in the liver 4 cm.    Recovered well from the surgery.  Is now eating normally and gaining 4 pounds so far.  Bowels are regular.  No longer on pain medication or stool softeners.  No blood in stool or melena.  No fevers.  Some moderate discomfort at the umbilicus where the incision was for the second surgery.  Previous surgery incisions have healed well.  No fever or drainage from that area.    Pathology came back with a surprise finding of adenocarcinoma of the jejunum.  There was muscularis invasion and.  Muscular tissue invasion no serosal penetration.  Lymph nodes were not assessed as this was not a wide resection.  There was adenocarcinoma 0.7 cm from the margin, low-grade dysplasia was seen at one margin.    November he had normal liver function  tests.    December 30 hemoglobin 13, blood sugar 108 and creatinine 0.8.    His not had a cough or shortness of breath.  No chest pain.  No palpitations or history of arrhythmia.  No history of fainting.    He has been a half pack a day smoker.  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    No previous bowel issues.  No history of inflammatory bowel disease.    No history of celiac disease.    His diet has been good in the past.    No family history of cancer, specifically no colon cancer or other adenocarcinomas in the family.  No one with Jauregui syndrome.  He does know his family history fairly well.    No headache or vision changes.  No swallowing difficulty.  No cough.  No urinary difficulty or issues.  No lower extremity edema.    No history of DVT or sleep apnea.    He is not taking any medication at this time.  He is not on an aspirin or other NSAIDs.    He has a liver biopsy scheduled for January 15 and a follow-up with the surgeon, Dr. Colon, on January 16.  He has not established care with oncology yet.    Patient was previously very active person.  Walk regularly.  Good exercise tolerance.  No past history of heart disease.  No family history of heart disease.    His blood pressure has been normal.  No previous cholesterol check.    PMHx:    Past Medical History:   Diagnosis Date     Partial bowel obstruction      PSHx:    Past Surgical History:   Procedure Laterality Date     COLECTOMY N/A 12/29/2017    Procedure:  LAPAROTOMY, SEGMENTAL SMALL BOWEL RESECTION ;  Surgeon: Jackie Colon MD;  Location: [facility];  Service:      INGUINAL HERNIA REPAIR Bilateral 11/11/2017    Procedure: REPAIR, HERNIA, INGUINAL, BILATERAL;  Surgeon: Jackie Colon MD;  Location: [facility];  Service:      LA LAP,DIAGNOSTIC ABDOMEN N/A 11/11/2017    Procedure: DIAGNOSTIC LAPAROSCOPY WITH LYSIS OF ADHESION;  Surgeon: Jackie Colon MD;  Location: [facility];  Service: General     LA  "LAP,DIAGNOSTIC ABDOMEN N/A 12/29/2017    Procedure:  DIAGNOSTIC LAPAROSCOPY;  Surgeon: Jackie Colon MD;  [facility];  Service: General     right knee surgery      kneecap surgery     Immunizations:   There is no immunization history on file for this patient.    ROS A comprehensive review of systems was performed and was otherwise negative    Medications, allergies, and problem list were reviewed and updated    Exam  /70  Pulse 81  Ht 6' 1.25\" (1.861 m)  Wt 161 lb (73 kg)  SpO2 98%  BMI 21.1 kg/m2  Healthy-appearing male, thin.  Alert and oriented ×3 with normal mood and affect.  Pupils and irises equal and reactive.  Extraocular muscles intact.  No jaundice or conjunctivitis.  Pharynx is normal, not crowded.  He has 1 right maxillary premolar with caries but no evidence of inflammation.  The remaining teeth are in good condition, missing 1 molar on the left mandibular.  No significant gingivitis or other oral lesions.  No cervical or supraclavicular or axillary adenopathy.  Heart is regular without murmur rub or gallop.  No carotid bruits.  No JVD.  Abdomen is nonobese and soft.  No hepatosplenomegaly.  Incision at the umbilicus is healing, some slight tenderness, slight granulation tissue erythema, does not appear infected and no fluctuance or drainage.  Bilateral groin incisions have healed well.  No lower extremity edema or calf tenderness.  Gait normal.    EKG November 2017 normal sinus rhythm, left anterior fascicular block but otherwise normal.    Assessment/Plan  1. Adenocarcinoma of jejunum  New diagnosis.  Needs staging.  Proceed with liver biopsy.  If liver biopsy negative, he will proceed with wide segment surgical resection of the tumor and lymph nodes and mesentery.  He meets with the surgeon the day after liver biopsy.    He will also be referred to oncology.  - Ambulatory referral to Oncology    I discussed diagnosis, patient has good understanding of his condition and " plan.  Patient given a copy of pathology report.    Patient has recovered well from his small bowel resection surgery with normal bowel movements, good diet and increasing weight.  He will continue to eat eggs and calorie dense foods to improve nutrition.  Continue regular ambulation and activity.    Patient does have surgical clearance to proceed with further bowel resection in the next month if needed.  Be n.p.o. after midnight, but is not other medication.  He appears to be low cardiovascular surgical risk and no other medical issues identified at this time.    Patient would need potassium and hemoglobin recheck prior to upcoming surgery, but that could be ordered by the surgeon.  Would anticipate those labs would be okay.    Patient does need to quit smoking.    2. Liver mass  Unclear etiology.  Need to rule out metastases with biopsy as planned.    I did discuss bleeding risk from liver biopsy.  Patient accepts these risks and will plan to proceed.    He has not had a flu shot this season, but declines flu shot.    December 29, 2017 normal platelets of 266, no history of uremia or cirrhosis.    Return in about 2 months (around 3/12/2018) for Recheck.   Patient Instructions   See  for referral to oncology.    Proceed with liver biopsy and follow-up with Dr. Colon as planned.    Follow-up with me as needed at any time.    You do have surgical clearance for any surgery coming up in the next few weeks.    Follow-up with me later this winter or early spring, after your initial treatment has been completed, for checkup.      Alcides Martin MD      Current Outpatient Prescriptions   Medication Sig Dispense Refill     docusate sodium (COLACE) 100 MG capsule Take 100 mg by mouth 2 (two) times a day as needed for constipation.       HYDROcodone-acetaminophen 5-325 mg per tablet Take 1-2 tablets by mouth every 6 (six) hours as needed. 20 tablet 0     ondansetron (ZOFRAN) 4 MG tablet Take 4 mg by mouth every 6  (six) hours as needed for nausea.       No current facility-administered medications for this visit.      No Known Allergies  Social History   Substance Use Topics     Smoking status: Current Every Day Smoker     Packs/day: 0.50     Smokeless tobacco: Never Used     Alcohol use 0.6 oz/week     1 Glasses of wine per week

## 2021-06-16 PROBLEM — C17.1: Status: ACTIVE | Noted: 2018-01-12

## 2021-06-16 NOTE — PROGRESS NOTES
(1051) No lab orders noted for today. Clarified with Dr. Feliz - plan is to use lab results from 2/12/18, per Dr. Feliz no new lab draw today and pharmacy notified. Lab results from 2/12/18 within parameters for patient to receive chemotherapy.  (5914) Patient tolerated pre-medication and chemotherapy as ordered today with no complaints/no signs of adverse reaction. Blood return confirmed at port site prior to start of each chemotherapy and during 5FU push blood return checked every 2-3 minutes. Patient ate lunch during visit today/ independent with ambulation to bathroom. Upon patient arrival to clinic this writer was notified per  patient would not be receiving traztuzumab as noted per last MD visit with Dr. Feliz per MD notes as drug had not been approved per patient insurance/ during patient's visit today did receive notice dry has been approved and spoke with Dr. Feliz regarding how to proceed with having this drug added to treatment this cycle as would not be able to complete due to time constraints with adding additional 90 minute infusion would be past clinic hours today/ plan made with patient per Dr. Feliz patient to return on Wednesday for pump disconnect and will receive first dose of traztuzumab at that visit and noted Patient did sign new consent for new plan of chemotherapy with Dr. Feliz today. Patient discharged to home with portable ambulatory infusion pump with 5FU as prescribed. Patient reports is familiar with pump and does have newly prescribed Dexamethasone to take tomorrow and Wednesday to prevent nausea as experienced after first treatment. Patient encouraged to call with any questions/concerns regarding side effects from chemotherapy and agrees. Patient discharged to home ambulatory with his wife.

## 2021-06-16 NOTE — PROGRESS NOTES
[facility]ematology and Oncology Progress Note    Patient: Terrell Bruno  MRN: [id number]  Date of Service: 02/12/2018      Assessment and Plan:    Adenocarcinoma of jejunum    Staging form: Small Intestine - Adenocarcinoma, AJCC 8th Edition    - Clinical: No stage assigned - Unsigned    - Pathologic: Stage IV (pT3, pNX, pM1) - Signed by Chauncey Feliz MD on 1/22/2018    1.  Small bowel adenocarcinoma: He said 1 cycle of chemotherapy.  He had a fair amount of nausea and vomiting on days 2 and 3.  Also some epigastric discomfort.  As such, I am going to order dexamethasone, 8 mg daily, on days 2 and 3 after each subsequent cycle of chemotherapy.  Interestingly, his tumor is strongly positive for HER-2/jeanie session.  Therefore, I think trastuzumab should be added to his treatment regimen.  This was reviewed with the patient and his wife.  The dosing options are weekly or every 3 weeks.  Some so FOLFOX cannot be given weekly were going to change him to every 3 week treatments including the trastuzumab.  Therefore we will delay him a week from today.  Reviewed the rationale for adding the medication.  We talked about some of the more common side effects including cardiotoxicity.  He will get an echocardiogram this week for baseline.  At this point I would like to give him 4 doses of chemotherapy and reimage.  If he is responding then we will refer him for surgery.      ECOG Performance   ECOG Performance Status: 0    Distress Assessment  Distress Assessment Score: 3 (comes and goes)    Pain  Currently in Pain: No/denies    Diagnosis:    1.  Metastatic adenocarcinoma of the jejunum: One metastatic deposit noted in the liver.  Pathologic diagnosis from the small bowel was made in December 2017.  Liver biopsy was done in January 2018.  MSI-low, KRAS mutation positive , NRAS negative, BRAF qns  HER-2/jeanie 3+.    Treatment:    He had partial small bowel resection on December 29, 2017.  Pathology revealed an invasive  "well to moderately differentiated adenocarcinoma.  Surgical margins were close.  Liver biopsy in January 15, 2018 showed metastatic adenocarcinoma consistent with small intestinal primary source.    Cycle 1 of FOLFOX plus Avastin was given on Mary 30th 2018.    Interim History:    Terrell returns today for a pretreatment visit.  He had cycle 1 2 weeks ago.  On days 2 and 3 he had a fair amount of epigastric pain discomfort in his upper extremities and nausea.  He vomited once.  Felt like he had the flu.  He has occasional cold sensitivity as well.  He had 2 nosebleeds both which were self-limiting.  No other pain or bleeding.  Currently feeling quite well.  Appetite is fine.  No change in his bowel habits.    Review of Systems:    Constitutional  Constitutional (WDL): Exceptions to WDL  Fatigue: Fatigue relieved by rest  Weight Gain: 5 - <10% from baseline (15 lbs in last month)  Neurosensory  Neurosensory (WDL): Exceptions to WDL  Peripheral Sensory Neuropathy: Asymptomatic, loss of deep tendon reflexes or paresthesia (cold sensitivity occ. post tx - better)  Cardiovascular  Cardiovascular (WDL): All cardiovascular elements are within defined limits  Pulmonary  Respiratory (WDL): Within Defined Limits  Gastrointestinal  Gastrointestinal (WDL): All gastrointestinal elements are within defined limits  Genitourinary  Genitourinary (WDL): All genitourinary elements are within defined limits  Integumentary  Integumentary (WDL): All integumentary elements are within defined limits  Patient Coping  Patient Coping: Accepting  Accompanied by  Accompanied by: Family Member    Past History:    Past Medical History:   Diagnosis Date     Cancer     small intestine     Colon cancer      Partial bowel obstruction      Physical Exam:    Recent Vitals 2/12/2018   Height 6' 2\"   Weight 176 lbs 13 oz   BSA (m2) 2.05 m2   /66   Pulse 98   Temp 97.9   Temp src 1   SpO2 97   Some recent data might be hidden     General: patient " appears stated age of 52 y.o.. Nontoxic and in no distress.   HEENT: Head: atraumatic, normocephalic. Sclerae anicteric.  Chest:  Normal respiratory effort  Cardiac:  No edema.   Abdomen: abdomen is non-distended  Extremities: normal tone and muscle bulk.  Skin: no lesions or rash. Warm and dry.   CNS: alert and oriented. Grossly non-focal.   Psychiatric: normal mood and affect.     Lab Results:    Recent Results (from the past 168 hour(s))   Magnesium   Result Value Ref Range    Magnesium 2.1 1.8 - 2.6 mg/dL   Comprehensive Metabolic Panel   Result Value Ref Range    Sodium 140 136 - 145 mmol/L    Potassium 4.0 3.5 - 5.0 mmol/L    Chloride 107 98 - 107 mmol/L    CO2 24 22 - 31 mmol/L    Anion Gap, Calculation 9 5 - 18 mmol/L    Glucose 101 70 - 125 mg/dL    BUN 14 8 - 22 mg/dL    Creatinine 0.75 0.70 - 1.30 mg/dL    GFR MDRD Af Amer >60 >60 mL/min/1.73m2    GFR MDRD Non Af Amer >60 >60 mL/min/1.73m2    Bilirubin, Total 0.2 0.0 - 1.0 mg/dL    Calcium 9.0 8.5 - 10.5 mg/dL    Protein, Total 6.9 6.0 - 8.0 g/dL    Albumin 3.6 3.5 - 5.0 g/dL    Alkaline Phosphatase 98 45 - 120 U/L    AST 21 0 - 40 U/L    ALT 33 0 - 45 U/L   HM1 (CBC with Diff)   Result Value Ref Range    WBC 7.1 4.0 - 11.0 thou/uL    RBC 4.58 4.40 - 6.20 mill/uL    Hemoglobin 14.4 14.0 - 18.0 g/dL    Hematocrit 41.9 40.0 - 54.0 %    MCV 92 80 - 100 fL    MCH 31.4 27.0 - 34.0 pg    MCHC 34.4 32.0 - 36.0 g/dL    RDW 12.3 11.0 - 14.5 %    Platelets 244 140 - 440 thou/uL    MPV 8.7 8.5 - 12.5 fL    Neutrophils % 54 50 - 70 %    Lymphocytes % 30 20 - 40 %    Monocytes % 11 (H) 2 - 10 %    Eosinophils % 4 0 - 6 %    Basophils % 1 0 - 2 %    Neutrophils Absolute 3.9 2.0 - 7.7 thou/uL    Lymphocytes Absolute 2.1 0.8 - 4.4 thou/uL    Monocytes Absolute 0.8 0.0 - 0.9 thou/uL    Eosinophils Absolute 0.3 0.0 - 0.4 thou/uL    Basophils Absolute 0.1 0.0 - 0.2 thou/uL     Imaging:    Mr Liver With Without Contrast    Result Date: 1/23/2018  MR LIVER W WO CONTRAST  1/23/2018 8:04 AM INDICATION: Evaluate metastatic liver lesion. Possible treatment. TECHNIQUE: Routine MRI liver protocol with multiple axial and coronal T1 and T2 sequences. Exam without and with IV contrast. IV CONTRAST: Eovist 7.5 COMPARISON: PET/CT dated 1/19/2018. CT of the abdomen and pelvis dated 12/28/2017. FINDINGS: Exam is slightly limited by contrast bolus timing. The liver is mildly elongated without hepatic steatosis. There is a 5.0 x 4.4 x 5.3 cm mass in the caudate lobe segment I and the posterior aspect of the medial left hepatic lobe segment Ema with enhancement of multiple septations and intermediate signal  intensity on T2-weighted imaging. On the study from December 28 this was approximately 4.5 x 3.9 x 4.7 cm. Otherwise no hepatic masses are present. Normal gallbladder. No biliary ductal dilatation. No bowel obstruction. There are postsurgical changes along the anterior abdominal wall. No abdominal lymphadenopathy. Normal spleen, pancreas, adrenal glands, and kidneys.     CONCLUSION: 1.  A larger hepatic mass likely represents metastatic disease. 2.  No bowel obstruction. 3.  No lymphadenopathy identified.    Image Guided Liver Biopsy    Result Date: 1/15/2018  1. PERCUTANEOUS BIOPSY LIVER MASS 2. ULTRASOUND GUIDANCE 3. CONSCIOUS SEDATION 1/15/2018 11:17 AM INDICATION: Liver lesion, Known small bowel adenocarcinoma PROCEDURE: Informed consent obtained. Time out performed. The site was prepped and draped in sterile fashion. 10 mL of 1 percent lidocaine was infused into the local soft tissues. Using standard technique and under direct ultrasound guidance, an 18 gauge  biopsy needle was used to make three core biopsies of the central hepatic mass. Tissue was submitted to Pathology. The patient tolerated the procedure well. No complications. RADIOLOGIC SUPERVISION AND INTERPRETATION: ULTRASOUND GUIDANCE: Images demonstrate the biopsy needle in satisfactory position. SEDATION: Versed 1.5 mg. Fentanyl  150 mcg. The procedure was performed with administration intravenous conscious sedation with appropriate preoperative, intraoperative, and postoperative evaluation. 30 minutes of supervised face to face conscious sedation time was provided by a radiology nurse under my direct supervision.     CONCLUSION: Successful ultrasound-guided biopsy of the hepatic mass.    Nm Pet Ct Skull To Mid Thigh    Result Date: 1/19/2018  [facility] PET FDG/CT 1/19/2018 9:25 AM INDICATION: Small bowel cancer staging., New diagnosis of jejunal adenocarcinoma. Initial treatment strategy. TECHNIQUE: Serum glucose level 84 mg/dL. One hour post intravenous administration of 8.7 mCi F-18 FDG, PET imaging was performed from the skull base to the mid thighs utilizing attenuation correction with concurrent axial CT and PET/CT image fusion. Dose  reduction techniques were used. COMPARISON: CT 12/28/2017. FINDINGS: There is physiologic activity in the neck and chest. There is moderate FDG activity, SUV max 6.8, corresponding to a centrally necrotic solitary hepatic metastasis. The patient is status post recent small bowel resection and there is some minimal FDG activity at the operative site but no definite residual mass or mesenteric adenopathy. There is also mild FDG activity associated with bilateral inguinal hernia repairs. No additional sites of suspicious FDG activity. Degenerative disc disease lumbar interspaces.     CONCLUSION: Moderate FDG activity within a centrally necrotic solitary hepatic metastasis.      Signed by: Chauncey Feliz MD

## 2021-06-16 NOTE — PROGRESS NOTES
Pt came into infusion clinic for pump DC as ordered. Pt also needs Herceptin today. Pt reports no issues with pump. Herceptin ran over 90 min due to pt's 1st time getting this drug. Pt's HR tachy and Irreg. Ranging from 116-136. No dizziness. No vomiting or diarrhea. Pt feels like this could be do to the steroids. He is flushed and feeling antsy. This was reported to JESUS Villalta who said to get pt's weight and assess for dehydration. Pt's weight stable. Pawan came over to infusion and spoke with pt about HR and poss reasons for tachycardia. Pawan said he would speak with Dr. Feliz about this. Pt was told to call cancer care clinic if he continued to have palpitations and feeling antsy after being done with steroids. Pt took his last steroid dose this am. Pt tolerated Herceptin well and left infusion clinic via ambulatory. Pt will RTC as sched

## 2021-06-17 NOTE — PROGRESS NOTES
Per referral from Dr. Feliz, I met with Merrick's wife, Sydnee to help her process her grief.  Merrick  on 2018.  Sydnee shared the story of the Merrick's diagnosis of small cell bowel cancer, stage IV and his chemotherapy treatment.  She also discussed the trauma of finding him in the driveway after he collapsed well snowplowing.  His 20-year-old stepson performed CPR.    We discussed strategies to help her work through her grief.  I also gave her several books and resources to help with working through her grief.    Plan: I will continue to be available to Merrick's wife as needed.    Belen Wets MA, LP, LICSW

## 2021-06-26 ENCOUNTER — HEALTH MAINTENANCE LETTER (OUTPATIENT)
Age: 56
End: 2021-06-26

## 2021-07-03 NOTE — ADDENDUM NOTE
Addendum Note by Tabatha Cervantes RN at 12/27/2017 10:18 AM     Author: Tabatha Cervantes RN Service: -- Author Type: Registered Nurse    Filed: 12/27/2017 10:18 AM Encounter Date: 12/27/2017 Status: Signed    : Tabatha Cervantes RN (Registered Nurse)    Addended by: TABATHA CERVANTES on: 12/27/2017 10:18 AM        Modules accepted: Orders

## 2021-08-03 PROBLEM — K56.600 PARTIAL SMALL BOWEL OBSTRUCTION (H): Status: RESOLVED | Noted: 2017-11-10 | Resolved: 2018-02-13

## 2021-10-16 ENCOUNTER — HEALTH MAINTENANCE LETTER (OUTPATIENT)
Age: 56
End: 2021-10-16

## 2022-07-23 ENCOUNTER — HEALTH MAINTENANCE LETTER (OUTPATIENT)
Age: 57
End: 2022-07-23

## 2022-10-01 ENCOUNTER — HEALTH MAINTENANCE LETTER (OUTPATIENT)
Age: 57
End: 2022-10-01

## 2023-08-06 ENCOUNTER — HEALTH MAINTENANCE LETTER (OUTPATIENT)
Age: 58
End: 2023-08-06